# Patient Record
Sex: FEMALE | URBAN - METROPOLITAN AREA
[De-identification: names, ages, dates, MRNs, and addresses within clinical notes are randomized per-mention and may not be internally consistent; named-entity substitution may affect disease eponyms.]

---

## 2019-03-19 ENCOUNTER — HOSPITAL ENCOUNTER (OUTPATIENT)
Dept: LAB | Age: 39
Discharge: HOME | End: 2019-03-19

## 2019-03-19 ENCOUNTER — DOCUMENTATION ONLY (OUTPATIENT)
Dept: SURGERY | Age: 39
End: 2019-03-19

## 2019-03-19 ENCOUNTER — OFFICE VISIT (OUTPATIENT)
Dept: SURGERY | Age: 39
End: 2019-03-19

## 2019-03-19 VITALS
HEART RATE: 68 BPM | DIASTOLIC BLOOD PRESSURE: 89 MMHG | BODY MASS INDEX: 25.21 KG/M2 | SYSTOLIC BLOOD PRESSURE: 120 MMHG | WEIGHT: 137 LBS | HEIGHT: 62 IN

## 2019-03-19 DIAGNOSIS — N63.10 BREAST MASS, RIGHT: ICD-10-CM

## 2019-03-19 DIAGNOSIS — Z80.3 FAMILY HX-BREAST MALIGNANCY: Primary | ICD-10-CM

## 2019-03-19 DIAGNOSIS — R92.8 ABNORMAL ULTRASOUND OF BREAST: ICD-10-CM

## 2019-03-19 RX ORDER — MELATONIN
DAILY
COMMUNITY

## 2019-03-19 NOTE — PROGRESS NOTES
HISTORY OF PRESENT ILLNESS  Fany Majano is a 45 y.o. female. HPI  NEW patient consult referred by Dr. Mateusz Muhammad for high risk family history and RIGHT breast palpable mass. . Patient has felt masses outer breasts. She does not feel a mass today. 2 years ago she had a mammogram for a RIGHT breast mass, BIRADS 2. It has not changed in size. No skin changes or nipple discharge/retraction. No pain. Obstetric History       T0      L1     SAB0   TAB0   Ectopic0   Molar0   Multiple0   Live Births0    Obstetric Comments   Menarche 13, LMP 3/15/19, # of children 1, age of 4st delivery 32, Hysterectomy/oophorectomy no/no, Breast bx no, history of breast feeding yes, BCP no, Hormone therapy no     Family History:  Sister, breast cancer, dx at 29. Pt is one of 4 sisters. No other breast cancer in the family. [facility] [address] BIRADS 2    No mass seen on mammogram.  Pt could feel the RIGHT breast mass at that time. Review of Systems   Constitutional: Negative. HENT: Negative. Eyes: Negative. Respiratory: Negative. Cardiovascular: Negative. Gastrointestinal: Negative. Genitourinary: Negative. Musculoskeletal: Negative. Skin: Negative. Neurological: Negative. Endo/Heme/Allergies: Negative. Psychiatric/Behavioral: Negative. All other systems reviewed and are negative. Physical Exam   Pulmonary/Chest: Right breast exhibits no mass, no nipple discharge, no skin change and no tenderness. Left breast exhibits no mass, no nipple discharge, no skin change and no tenderness. Breasts are symmetrical.   Lymphadenopathy:     She has no cervical adenopathy. She has no axillary adenopathy. Right: No supraclavicular adenopathy present. Left: No supraclavicular adenopathy present. US - Guided Core Biopsy  Indication : Right Breast mass upper outer quadrant 11:00 1/3. Not palpable. Pt reported feeling bilateral breast masses.  Bilateral 4 quadrant ultrasound performed. Ultrasound Findings: Incidental finding of a RIGHT 11:00 1/3   0.71cm by 0.86cm oval, taller than wide mass with shadowing   No mass seen in the remainder of the RIGHT breast or the LEFT breast.  No axillary lymphadenopathy  Prep : alcohol. Anesthesia : 1% lidocaine with epinephrine, 7cc. Device : The hand-held 10 gauge BARD needle was inserted through the lesion and captured tissue with real-time Ultrasound Confirmation. .   Core Sampling :  3 cores were obtained. Marker: clip placed   Dressing : Steristrips, gauze and tape. Instructions : Remove gauze this evening. Remove steristrips in one week. Tolerance: Pt tolerated procedure with minimal discomfort  Pathology : Right breast mass, core biopsy:       Fragments of benign breast tissue  Concordance: yes    No past medical history on file. No past surgical history on file. Family History   Problem Relation Age of Onset    Breast Cancer Sister      Social History     Tobacco Use    Smoking status: Never Smoker    Smokeless tobacco: Never Used   Substance Use Topics    Alcohol use: Yes      Prior to Admission medications    Medication Sig Start Date End Date Taking? Authorizing Provider   prenatal vit-iron fumarate-fa 27 mg iron- 0.8 mg tab tablet Take 1 Tab by mouth daily. Yes Provider, Historical   cholecalciferol (VITAMIN D3) 1,000 unit tablet Take  by mouth daily. Yes Provider, Historical      Allergies   Allergen Reactions    Excedrin [Acetaminophen-Caffeine] Rash and Itching     \"feel really hot\"       ASSESSMENT and PLAN    ICD-10-CM ICD-9-CM    1. Family hx-breast malignancy Z80.3 V16.3 SURG[facility] PATHOLOG[facility]      [facility]   2. Breast mass, right N63.10 611.72 SURGICAL PATHOLOGY   3. Abnormal ultrasound of breast R92.8 793.89 SURGICAL PATHOLOGY     RIGHT breast mass, incidental finding on ultrasound. Biopsied today  If positive, consider breast MRI    Pt was BRCA tested today.   Her sister was diagnosed with breast cancer and is getting [facility]illy-adjuvant chemo in the [facility]    Called patient  Biopsy is benign  Will call with BRCA results in 3 weeks  Yearly mammogram and PRN follow up

## 2019-03-19 NOTE — PROGRESS NOTES
[facility]  OFFICE PROCEDURE PROGRESS NOTE        Chart reviewed for the following:   I, Dr. Aamir Spencer, have reviewed the History, Physical and updated the Allergic reactions for [address] performed immediately prior to start of procedure:   IDr. Aamir , have performed the following reviews on [place] prior to the start of the procedure:            * Patient was identified by name and date of birth   * Agreement on procedure being performed was verified  * Risks and Benefits explained to the patient  * Procedure site verified and marked as necessary  * Patient was positioned for comfort  * Consent was signed and verified     Time: 10:35am      Date of procedure: 3/19/2019    Procedure performed by:  Aamir Spencer MD    Provider assisted by: Wily Will RN    Patient assisted by: nursing attendant    How tolerated by patient: tolerated the procedure well with no complications    Post Procedural Pain Scale: 0 - No Hurt    Comments:  Written and verbal post biopsy instructions reviewed with and given to patient with her understanding.

## 2019-03-26 ENCOUNTER — DOCUMENTATION ONLY (OUTPATIENT)
Dept: SURGERY | Age: 39
End: 2019-03-26

## 2019-04-09 ENCOUNTER — DOCUMENTATION ONLY (OUTPATIENT)
Dept: SURGERY | Age: 39
End: 2019-04-09

## 2019-04-09 ENCOUNTER — TELEPHONE (OUTPATIENT)
Dept: SURGERY | Age: 39
End: 2019-04-09

## 2019-04-09 NOTE — PROGRESS NOTES
Placed genetic testing results from [facility] into mail to patient at address on record, confirmed with patient.

## 2019-04-09 NOTE — TELEPHONE ENCOUNTER
Spoke with patient  BRCA negative. DENI ROMAN    Worried about her mammogram because they are going to [facility] her like a pancake\"  Reassured her that mammograms are fine.     She will schedule

## 2019-04-23 NOTE — PATIENT INSTRUCTIONS
